# Patient Record
Sex: FEMALE | Race: ASIAN | NOT HISPANIC OR LATINO
[De-identification: names, ages, dates, MRNs, and addresses within clinical notes are randomized per-mention and may not be internally consistent; named-entity substitution may affect disease eponyms.]

---

## 2017-02-27 ENCOUNTER — RESULT REVIEW (OUTPATIENT)
Age: 27
End: 2017-02-27

## 2017-09-28 ENCOUNTER — INPATIENT (INPATIENT)
Facility: HOSPITAL | Age: 27
LOS: 2 days | Discharge: HOME | End: 2017-10-01
Attending: OBSTETRICS & GYNECOLOGY | Admitting: OBSTETRICS & GYNECOLOGY

## 2017-09-28 ENCOUNTER — EMERGENCY (EMERGENCY)
Facility: HOSPITAL | Age: 27
LOS: 1 days | Discharge: STILL ADMITTED OR ADMINISTRATIVE | End: 2017-09-28
Admitting: EMERGENCY MEDICINE

## 2017-09-28 VITALS
TEMPERATURE: 98 F | SYSTOLIC BLOOD PRESSURE: 124 MMHG | RESPIRATION RATE: 16 BRPM | HEART RATE: 80 BPM | DIASTOLIC BLOOD PRESSURE: 76 MMHG | OXYGEN SATURATION: 100 %

## 2017-09-28 DIAGNOSIS — O26.899 OTHER SPECIFIED PREGNANCY RELATED CONDITIONS, UNSPECIFIED TRIMESTER: ICD-10-CM

## 2017-09-28 DIAGNOSIS — Z3A.00 WEEKS OF GESTATION OF PREGNANCY NOT SPECIFIED: ICD-10-CM

## 2017-09-28 RX ORDER — CITRIC ACID/SODIUM CITRATE 300-500 MG
15 SOLUTION, ORAL ORAL EVERY 4 HOURS
Qty: 0 | Refills: 0 | Status: DISCONTINUED | OUTPATIENT
Start: 2017-09-28 | End: 2017-09-29

## 2017-09-28 RX ORDER — SODIUM CHLORIDE 9 MG/ML
1000 INJECTION, SOLUTION INTRAVENOUS ONCE
Qty: 0 | Refills: 0 | Status: DISCONTINUED | OUTPATIENT
Start: 2017-09-28 | End: 2017-09-29

## 2017-09-28 RX ORDER — SODIUM CHLORIDE 9 MG/ML
1000 INJECTION, SOLUTION INTRAVENOUS
Qty: 0 | Refills: 0 | Status: DISCONTINUED | OUTPATIENT
Start: 2017-09-28 | End: 2017-09-29

## 2017-09-28 RX ORDER — OXYTOCIN 10 UNIT/ML
333.33 VIAL (ML) INJECTION
Qty: 20 | Refills: 0 | Status: COMPLETED | OUTPATIENT
Start: 2017-09-28

## 2017-09-28 NOTE — ED ADULT TRIAGE NOTE - CHIEF COMPLAINT QUOTE
Pt arrives to ED with LEO of 10/3/17 reporting contractions 2-6 minutes apart.  L&D called.  Pt transported by ED tech.

## 2017-09-29 VITALS — WEIGHT: 169.98 LBS

## 2017-09-29 LAB
BASOPHILS # BLD AUTO: 0.06 K/UL — SIGNIFICANT CHANGE UP (ref 0–0.2)
BASOPHILS NFR BLD AUTO: 0.4 % — SIGNIFICANT CHANGE UP (ref 0–2)
BLD GP AB SCN SERPL QL: NEGATIVE — SIGNIFICANT CHANGE UP
EOSINOPHIL # BLD AUTO: 0.34 K/UL — SIGNIFICANT CHANGE UP (ref 0–0.5)
EOSINOPHIL NFR BLD AUTO: 2.1 % — SIGNIFICANT CHANGE UP (ref 0–6)
HBV SURFACE AG SER-ACNC: NEGATIVE — SIGNIFICANT CHANGE UP
HCT VFR BLD CALC: 38.6 % — SIGNIFICANT CHANGE UP (ref 34.5–45)
HGB BLD-MCNC: 13.1 G/DL — SIGNIFICANT CHANGE UP (ref 11.5–15.5)
IMM GRANULOCYTES # BLD AUTO: 0.09 # — SIGNIFICANT CHANGE UP
IMM GRANULOCYTES NFR BLD AUTO: 0.6 % — SIGNIFICANT CHANGE UP (ref 0–1.5)
LYMPHOCYTES # BLD AUTO: 18.9 % — SIGNIFICANT CHANGE UP (ref 13–44)
LYMPHOCYTES # BLD AUTO: 3.05 K/UL — SIGNIFICANT CHANGE UP (ref 1–3.3)
MCHC RBC-ENTMCNC: 29.6 PG — SIGNIFICANT CHANGE UP (ref 27–34)
MCHC RBC-ENTMCNC: 33.9 % — SIGNIFICANT CHANGE UP (ref 32–36)
MCV RBC AUTO: 87.1 FL — SIGNIFICANT CHANGE UP (ref 80–100)
MONOCYTES # BLD AUTO: 1.13 K/UL — HIGH (ref 0–0.9)
MONOCYTES NFR BLD AUTO: 7 % — SIGNIFICANT CHANGE UP (ref 2–14)
NEUTROPHILS # BLD AUTO: 11.45 K/UL — HIGH (ref 1.8–7.4)
NEUTROPHILS NFR BLD AUTO: 71 % — SIGNIFICANT CHANGE UP (ref 43–77)
NRBC # FLD: 0 — SIGNIFICANT CHANGE UP
PLATELET # BLD AUTO: 325 K/UL — SIGNIFICANT CHANGE UP (ref 150–400)
PMV BLD: 9.6 FL — SIGNIFICANT CHANGE UP (ref 7–13)
RBC # BLD: 4.43 M/UL — SIGNIFICANT CHANGE UP (ref 3.8–5.2)
RBC # FLD: 12.4 % — SIGNIFICANT CHANGE UP (ref 10.3–14.5)
RH IG SCN BLD-IMP: POSITIVE — SIGNIFICANT CHANGE UP
RUBV IGG SER-ACNC: 8.3 INDEX — SIGNIFICANT CHANGE UP
RUBV IGG SER-IMP: POSITIVE — SIGNIFICANT CHANGE UP
T PALLIDUM AB TITR SER: NEGATIVE — SIGNIFICANT CHANGE UP
WBC # BLD: 16.12 K/UL — HIGH (ref 3.8–10.5)
WBC # FLD AUTO: 16.12 K/UL — HIGH (ref 3.8–10.5)

## 2017-09-29 RX ORDER — DIBUCAINE 1 %
1 OINTMENT (GRAM) RECTAL EVERY 4 HOURS
Qty: 0 | Refills: 0 | Status: DISCONTINUED | OUTPATIENT
Start: 2017-09-29 | End: 2017-10-01

## 2017-09-29 RX ORDER — SODIUM CHLORIDE 9 MG/ML
3 INJECTION INTRAMUSCULAR; INTRAVENOUS; SUBCUTANEOUS EVERY 8 HOURS
Qty: 0 | Refills: 0 | Status: DISCONTINUED | OUTPATIENT
Start: 2017-09-29 | End: 2017-10-01

## 2017-09-29 RX ORDER — DIPHENHYDRAMINE HCL 50 MG
25 CAPSULE ORAL EVERY 6 HOURS
Qty: 0 | Refills: 0 | Status: DISCONTINUED | OUTPATIENT
Start: 2017-09-29 | End: 2017-10-01

## 2017-09-29 RX ORDER — ACETAMINOPHEN 500 MG
975 TABLET ORAL EVERY 6 HOURS
Qty: 0 | Refills: 0 | Status: COMPLETED | OUTPATIENT
Start: 2017-09-29 | End: 2018-08-28

## 2017-09-29 RX ORDER — KETOROLAC TROMETHAMINE 30 MG/ML
30 SYRINGE (ML) INJECTION ONCE
Qty: 0 | Refills: 0 | Status: DISCONTINUED | OUTPATIENT
Start: 2017-09-29 | End: 2017-09-29

## 2017-09-29 RX ORDER — DOCUSATE SODIUM 100 MG
100 CAPSULE ORAL
Qty: 0 | Refills: 0 | Status: DISCONTINUED | OUTPATIENT
Start: 2017-09-29 | End: 2017-10-01

## 2017-09-29 RX ORDER — SODIUM CHLORIDE 9 MG/ML
3 INJECTION INTRAMUSCULAR; INTRAVENOUS; SUBCUTANEOUS EVERY 8 HOURS
Qty: 0 | Refills: 0 | Status: DISCONTINUED | OUTPATIENT
Start: 2017-09-29 | End: 2017-09-29

## 2017-09-29 RX ORDER — HYDROCORTISONE 1 %
1 OINTMENT (GRAM) TOPICAL EVERY 4 HOURS
Qty: 0 | Refills: 0 | Status: DISCONTINUED | OUTPATIENT
Start: 2017-09-29 | End: 2017-09-29

## 2017-09-29 RX ORDER — HYDROCORTISONE 1 %
1 OINTMENT (GRAM) TOPICAL EVERY 4 HOURS
Qty: 0 | Refills: 0 | Status: DISCONTINUED | OUTPATIENT
Start: 2017-09-29 | End: 2017-09-30

## 2017-09-29 RX ORDER — OXYCODONE HYDROCHLORIDE 5 MG/1
5 TABLET ORAL EVERY 4 HOURS
Qty: 0 | Refills: 0 | Status: DISCONTINUED | OUTPATIENT
Start: 2017-09-29 | End: 2017-10-01

## 2017-09-29 RX ORDER — TETANUS TOXOID, REDUCED DIPHTHERIA TOXOID AND ACELLULAR PERTUSSIS VACCINE, ADSORBED 5; 2.5; 8; 8; 2.5 [IU]/.5ML; [IU]/.5ML; UG/.5ML; UG/.5ML; UG/.5ML
0.5 SUSPENSION INTRAMUSCULAR ONCE
Qty: 0 | Refills: 0 | Status: DISCONTINUED | OUTPATIENT
Start: 2017-09-29 | End: 2017-10-01

## 2017-09-29 RX ORDER — PRAMOXINE HYDROCHLORIDE 150 MG/15G
1 AEROSOL, FOAM RECTAL EVERY 4 HOURS
Qty: 0 | Refills: 0 | Status: DISCONTINUED | OUTPATIENT
Start: 2017-09-29 | End: 2017-09-29

## 2017-09-29 RX ORDER — OXYTOCIN 10 UNIT/ML
41.67 VIAL (ML) INJECTION
Qty: 20 | Refills: 0 | Status: DISCONTINUED | OUTPATIENT
Start: 2017-09-29 | End: 2017-09-30

## 2017-09-29 RX ORDER — OXYTOCIN 10 UNIT/ML
41.67 VIAL (ML) INJECTION
Qty: 20 | Refills: 0 | Status: DISCONTINUED | OUTPATIENT
Start: 2017-09-29 | End: 2017-09-29

## 2017-09-29 RX ORDER — LANOLIN
1 OINTMENT (GRAM) TOPICAL EVERY 6 HOURS
Qty: 0 | Refills: 0 | Status: DISCONTINUED | OUTPATIENT
Start: 2017-09-29 | End: 2017-10-01

## 2017-09-29 RX ORDER — AER TRAVELER 0.5 G/1
1 SOLUTION RECTAL; TOPICAL EVERY 4 HOURS
Qty: 0 | Refills: 0 | Status: DISCONTINUED | OUTPATIENT
Start: 2017-09-29 | End: 2017-10-01

## 2017-09-29 RX ORDER — DIBUCAINE 1 %
1 OINTMENT (GRAM) RECTAL EVERY 4 HOURS
Qty: 0 | Refills: 0 | Status: DISCONTINUED | OUTPATIENT
Start: 2017-09-29 | End: 2017-09-29

## 2017-09-29 RX ORDER — IBUPROFEN 200 MG
600 TABLET ORAL EVERY 6 HOURS
Qty: 0 | Refills: 0 | Status: DISCONTINUED | OUTPATIENT
Start: 2017-09-29 | End: 2017-10-01

## 2017-09-29 RX ORDER — GLYCERIN ADULT
1 SUPPOSITORY, RECTAL RECTAL AT BEDTIME
Qty: 0 | Refills: 0 | Status: DISCONTINUED | OUTPATIENT
Start: 2017-09-29 | End: 2017-10-01

## 2017-09-29 RX ORDER — MAGNESIUM HYDROXIDE 400 MG/1
30 TABLET, CHEWABLE ORAL
Qty: 0 | Refills: 0 | Status: DISCONTINUED | OUTPATIENT
Start: 2017-09-29 | End: 2017-10-01

## 2017-09-29 RX ORDER — AER TRAVELER 0.5 G/1
1 SOLUTION RECTAL; TOPICAL EVERY 4 HOURS
Qty: 0 | Refills: 0 | Status: DISCONTINUED | OUTPATIENT
Start: 2017-09-29 | End: 2017-09-29

## 2017-09-29 RX ORDER — SIMETHICONE 80 MG/1
80 TABLET, CHEWABLE ORAL EVERY 6 HOURS
Qty: 0 | Refills: 0 | Status: DISCONTINUED | OUTPATIENT
Start: 2017-09-29 | End: 2017-10-01

## 2017-09-29 RX ORDER — OXYCODONE HYDROCHLORIDE 5 MG/1
5 TABLET ORAL
Qty: 0 | Refills: 0 | Status: DISCONTINUED | OUTPATIENT
Start: 2017-09-29 | End: 2017-10-01

## 2017-09-29 RX ORDER — IBUPROFEN 200 MG
600 TABLET ORAL EVERY 6 HOURS
Qty: 0 | Refills: 0 | Status: COMPLETED | OUTPATIENT
Start: 2017-09-29 | End: 2018-08-28

## 2017-09-29 RX ORDER — PRAMOXINE HYDROCHLORIDE 150 MG/15G
1 AEROSOL, FOAM RECTAL EVERY 4 HOURS
Qty: 0 | Refills: 0 | Status: DISCONTINUED | OUTPATIENT
Start: 2017-09-29 | End: 2017-09-30

## 2017-09-29 RX ORDER — OXYTOCIN 10 UNIT/ML
333.33 VIAL (ML) INJECTION
Qty: 20 | Refills: 0 | Status: COMPLETED | OUTPATIENT
Start: 2017-09-29 | End: 2017-09-29

## 2017-09-29 RX ORDER — ACETAMINOPHEN 500 MG
975 TABLET ORAL EVERY 6 HOURS
Qty: 0 | Refills: 0 | Status: DISCONTINUED | OUTPATIENT
Start: 2017-09-29 | End: 2017-10-01

## 2017-09-29 RX ORDER — INFLUENZA VIRUS VACCINE 15; 15; 15; 15 UG/.5ML; UG/.5ML; UG/.5ML; UG/.5ML
0.5 SUSPENSION INTRAMUSCULAR ONCE
Qty: 0 | Refills: 0 | Status: DISCONTINUED | OUTPATIENT
Start: 2017-09-29 | End: 2017-10-01

## 2017-09-29 RX ADMIN — Medication 600 MILLIGRAM(S): at 18:15

## 2017-09-29 RX ADMIN — SODIUM CHLORIDE 125 MILLILITER(S): 9 INJECTION, SOLUTION INTRAVENOUS at 00:43

## 2017-09-29 RX ADMIN — Medication 30 MILLIGRAM(S): at 10:18

## 2017-09-29 RX ADMIN — SODIUM CHLORIDE 125 MILLILITER(S): 9 INJECTION, SOLUTION INTRAVENOUS at 05:51

## 2017-09-29 RX ADMIN — Medication 30 MILLIGRAM(S): at 10:45

## 2017-09-29 RX ADMIN — SODIUM CHLORIDE 3 MILLILITER(S): 9 INJECTION INTRAMUSCULAR; INTRAVENOUS; SUBCUTANEOUS at 13:45

## 2017-09-29 RX ADMIN — Medication 1000 MILLIUNIT(S)/MIN: at 09:04

## 2017-09-29 RX ADMIN — Medication 125 MILLIUNIT(S)/MIN: at 09:36

## 2017-09-29 RX ADMIN — Medication 600 MILLIGRAM(S): at 17:23

## 2017-09-29 RX ADMIN — Medication 600 MILLIGRAM(S): at 23:33

## 2017-09-29 RX ADMIN — Medication 600 MILLIGRAM(S): at 22:57

## 2017-09-29 NOTE — LACTATION INITIAL EVALUATION - INTERVENTION OUTCOME
Assisted pt with positioning to facilitate an effective latch. Pt able to demonstrate hand expression. Reviewed feeding on demand, feeding cues and use of feeding log. Safe sleep and safe skin to skin reviewed.

## 2017-09-30 ENCOUNTER — TRANSCRIPTION ENCOUNTER (OUTPATIENT)
Age: 27
End: 2017-09-30

## 2017-09-30 DIAGNOSIS — O26.899 OTHER SPECIFIED PREGNANCY RELATED CONDITIONS, UNSPECIFIED TRIMESTER: ICD-10-CM

## 2017-09-30 RX ORDER — HYDROCORTISONE 1 %
1 OINTMENT (GRAM) TOPICAL EVERY 4 HOURS
Qty: 0 | Refills: 0 | Status: DISCONTINUED | OUTPATIENT
Start: 2017-09-30 | End: 2017-10-01

## 2017-09-30 RX ORDER — PRAMOXINE HYDROCHLORIDE 150 MG/15G
1 AEROSOL, FOAM RECTAL EVERY 4 HOURS
Qty: 0 | Refills: 0 | Status: DISCONTINUED | OUTPATIENT
Start: 2017-09-30 | End: 2017-10-01

## 2017-09-30 RX ADMIN — Medication 600 MILLIGRAM(S): at 05:25

## 2017-09-30 RX ADMIN — Medication 600 MILLIGRAM(S): at 06:35

## 2017-09-30 RX ADMIN — Medication 600 MILLIGRAM(S): at 18:35

## 2017-09-30 RX ADMIN — Medication 600 MILLIGRAM(S): at 17:46

## 2017-09-30 RX ADMIN — Medication 1 TABLET(S): at 11:26

## 2017-09-30 NOTE — DISCHARGE NOTE OB - CARE PROVIDER_API CALL
Kamala Mcguire), Obstetrics and Gynecology  [address]  [address]  [place] [address]  Phone: [phone]  Fax: [phone]

## 2017-09-30 NOTE — DISCHARGE NOTE OB - PATIENT PORTAL LINK FT
“You can access the [facility] Patient Portal, offered by [facility], by registering with the following website: [email or web address]”

## 2017-09-30 NOTE — DISCHARGE NOTE OB - MEDICATION SUMMARY - MEDICATIONS TO STOP TAKING
I will STOP taking the medications listed below when I get home from the hospital:  None I will STOP taking the medications listed below when I get home from the hospital:    ibuprofen 400 mg oral tablet  -- 1 tab(s) by mouth every 6 hours, As needed, Mild Pain    clindamycin 300 mg oral capsule  -- 1 cap(s) by mouth every 6 hours  -- Finish all this medication unless otherwise directed by prescriber.  Medication should be taken with plenty of water.    Percocet 5/325 oral tablet  -- 1 tab(s) by mouth every 4 hours, As Needed, Pain MDD:6 tab

## 2017-09-30 NOTE — PROGRESS NOTE ADULT - SUBJECTIVE AND OBJECTIVE BOX
S: Patient doing well. Minimal lochia. Pain controlled.    O: Vital Signs Last 24 Hrs  T(C): 36.7 (30 Sep 2017 18:08), Max: 36.7 (30 Sep 2017 18:08)  T(F): 98.1 (30 Sep 2017 18:08), Max: 98.1 (30 Sep 2017 18:08)  HR: 84 (30 Sep 2017 18:08) (84 - 94)  BP: 122/72 (30 Sep 2017 18:08) (105/62 - 126/73)  BP(mean): --  RR: 18 (30 Sep 2017 18:08) (18 - 18)  SpO2: 99% (30 Sep 2017 18:08) (98% - 99%)    Gen: NAD  Abd: soft, NT, ND, fundus firm below umbilicus  Lochia: moderate  Ext: no tenderness    Labs:                        13.1   16.12 )-----------( 325      ( 29 Sep 2017 00:30 )             38.6       A: 26y PPD#1 s/p  doing well.    Plan:  routine postpartum care

## 2017-10-01 VITALS — SYSTOLIC BLOOD PRESSURE: 110 MMHG | DIASTOLIC BLOOD PRESSURE: 72 MMHG

## 2017-10-01 RX ORDER — IBUPROFEN 200 MG
1 TABLET ORAL
Qty: 0 | Refills: 0 | COMMUNITY
Start: 2017-10-01

## 2017-10-01 RX ORDER — ACETAMINOPHEN 500 MG
3 TABLET ORAL
Qty: 0 | Refills: 0 | COMMUNITY
Start: 2017-10-01

## 2017-10-01 RX ADMIN — Medication 975 MILLIGRAM(S): at 06:13

## 2017-10-01 RX ADMIN — Medication 975 MILLIGRAM(S): at 07:00

## 2017-10-01 RX ADMIN — Medication 600 MILLIGRAM(S): at 06:12

## 2017-10-01 RX ADMIN — Medication 600 MILLIGRAM(S): at 07:00

## 2021-01-10 ENCOUNTER — RESULT REVIEW (OUTPATIENT)
Age: 31
End: 2021-01-10

## 2021-10-13 ENCOUNTER — APPOINTMENT (OUTPATIENT)
Dept: ORTHOPEDIC SURGERY | Facility: CLINIC | Age: 31
End: 2021-10-13
Payer: MEDICAID

## 2021-10-13 VITALS — BODY MASS INDEX: 22.49 KG/M2 | HEIGHT: 65 IN | WEIGHT: 135 LBS

## 2021-10-13 DIAGNOSIS — M89.9 DISORDER OF BONE, UNSPECIFIED: ICD-10-CM

## 2021-10-13 PROCEDURE — 99203 OFFICE O/P NEW LOW 30 MIN: CPT

## 2021-10-13 NOTE — HISTORY OF PRESENT ILLNESS
[FreeTextEntry1] : This is the first visit of a 30 years old female that recently was complaining about localized pain tenderness over the left popliteal fossa x-ray MRI scan demonstrated an unrelated incidental finding of a benign-appearing nonaggressive medullary lesion left distal femur suggest to be a possible enchondroma.

## 2021-10-13 NOTE — PHYSICAL EXAM
[FreeTextEntry1] : Physical exam revealed a healthy looking patient in no apparent distress patient appears to be fully alert oriented having no complaints no pain examination of the left leg demonstrate full range of motion the hip and knee joint no palpable mass no gross neurovascular deficit review of the MRI scan of the left distal femur demonstrate an nonaggressive benign-appearing possible chondroid lesion distal femur.  At this stage patient was recommended to be followed conservatively and to be seen again in 9 months with a new MRI scan of the left knee including left distal femur 0

## 2023-08-22 NOTE — LACTATION INITIAL EVALUATION - AS DELIV VAG OTHER PROCEDURES
Patient's  called- informed pelvic ultrasound did show a nonspecific echogenic mass in the upper pelvic area.  Patient is agreeable to CT Scan.  Please place order for CT & sign.   will call central scheduling tomorrow to schedule appointment.   none

## 2024-03-07 ENCOUNTER — APPOINTMENT (OUTPATIENT)
Dept: PODIATRY | Facility: CLINIC | Age: 34
End: 2024-03-07
Payer: MEDICAID

## 2024-03-07 DIAGNOSIS — F41.9 ANXIETY DISORDER, UNSPECIFIED: ICD-10-CM

## 2024-03-07 PROCEDURE — 99203 OFFICE O/P NEW LOW 30 MIN: CPT | Mod: 25

## 2024-03-07 PROCEDURE — 17110 DESTRUCTION B9 LES UP TO 14: CPT

## 2024-03-14 NOTE — HISTORY OF PRESENT ILLNESS
[FreeTextEntry1] : Patient presents today with a complaint of left plantar heel pain with a skin lesion that started several months ago.  Patient states that it started as a small dot and now it has increased in size.  She denies stepping on any foreign objects, redness or drainage.  She denies similar lesions elsewhere on her body.  Patient has not tried any interventions for this problem.

## 2024-03-14 NOTE — PHYSICAL EXAM
[2+] : left foot dorsalis pedis 2+ [No Focal Deficits] : no focal deficits [Ankle Swelling (On Exam)] : not present [Varicose Veins Of Lower Extremities] : not present [FreeTextEntry3] : CFT: 3 seconds x 10.  Temperature gradient: normal.  [de-identified] : No gross deformities.  No joint effusions or erythema.   [FreeTextEntry1] :  Epicritic sensations intact, bilateral.

## 2024-03-14 NOTE — ASSESSMENT
[FreeTextEntry1] : Impression: Pain in left foot (M79.672).  Plantar wart, left foot (B07.0).  Treatment: Discussed etiology and treatment options.  Advised patient to clean fomites. The lesion was wiped with alcohol and all hyperkeratotic skin was shaved.  Topical Cantharone was applied with off-loading pads.  Patient was given post care instructions.   Return: 2 weeks.

## 2024-03-17 ENCOUNTER — TRANSCRIPTION ENCOUNTER (OUTPATIENT)
Age: 34
End: 2024-03-17

## 2024-03-25 ENCOUNTER — APPOINTMENT (OUTPATIENT)
Dept: PODIATRY | Facility: CLINIC | Age: 34
End: 2024-03-25
Payer: MEDICAID

## 2024-03-25 DIAGNOSIS — M79.672 PAIN IN LEFT FOOT: ICD-10-CM

## 2024-03-25 DIAGNOSIS — B07.0 PLANTAR WART: ICD-10-CM

## 2024-03-25 PROCEDURE — 17110 DESTRUCTION B9 LES UP TO 14: CPT

## 2024-03-26 NOTE — HISTORY OF PRESENT ILLNESS
[FreeTextEntry1] : Patient presents today for reevaluation of verrucous lesion, plantar aspect of the left heel.  The lesion continues to be present but is improving.  There are no new changes to her medical status.
5

## 2024-03-26 NOTE — ASSESSMENT
[FreeTextEntry1] : Impression: Pain in left foot (M79.672).  Plantar wart, left foot (B07.0).  Treatment: The blister was deroofed.  The painful lesion was debrided to pinpoint bleeding.  Cantharone was reapplied.  Dry, sterile dressing applied.  Patient was given home care instructions.  Will reevaluate in 2 weeks.  Any questions or problems, patient is to contact the office.

## 2024-03-26 NOTE — PHYSICAL EXAM
[2+] : left foot dorsalis pedis 2+ [No Joint Swelling] : no joint swelling [Normal Foot/Ankle] : Both lower extremities were exposed and visualized. Standing exam demonstrates normal foot posture and alignment. Hindfoot exam shows no hindfoot valgus or varus [Skin Turgor] : normal skin turgor [Skin Color & Pigmentation] : normal skin color and pigmentation [Foot Ulcer] : no foot ulcer [Skin Induration] : no skin induration [No Focal Deficits] : no focal deficits [Sensation] : the sensory exam was normal to light touch and pinprick [Deep Tendon Reflexes (DTR)] : deep tendon reflexes were 2+ and symmetric [Motor Exam] : the motor exam was normal [General Appearance - Alert] : alert [Oriented To Time, Place, And Person] : oriented to person, place, and time [Varicose Veins Of Lower Extremities] : not present [Ankle Swelling (On Exam)] : not present [Delayed in the Right Toes] : capillary refills normal in right toes [] : not present [Delayed in the Left Toes] : capillary refills normal in the left toes [de-identified] : No gross deformities.  No joint effusions or erythema.   [FreeTextEntry1] : Left plantar medial heel skin lesion with break in skin lines.  Pinpoint bleeding noted.  Pain with medial and lateral pressure consistent with plantar wart.  No redness, drainage or erythema.  No clinical signs of bacterial infection.

## 2024-04-09 ENCOUNTER — APPOINTMENT (OUTPATIENT)
Dept: PODIATRY | Facility: CLINIC | Age: 34
End: 2024-04-09
